# Patient Record
Sex: FEMALE | Race: WHITE | Employment: OTHER | URBAN - NONMETROPOLITAN AREA
[De-identification: names, ages, dates, MRNs, and addresses within clinical notes are randomized per-mention and may not be internally consistent; named-entity substitution may affect disease eponyms.]

---

## 2017-01-17 ENCOUNTER — AMBULATORY - GICH (OUTPATIENT)
Dept: SCHEDULING | Facility: OTHER | Age: 72
End: 2017-01-17

## 2017-01-20 ENCOUNTER — OFFICE VISIT - GICH (OUTPATIENT)
Dept: FAMILY MEDICINE | Facility: OTHER | Age: 72
End: 2017-01-20

## 2017-01-20 ENCOUNTER — HOSPITAL ENCOUNTER (OUTPATIENT)
Dept: RADIOLOGY | Facility: OTHER | Age: 72
End: 2017-01-20
Attending: FAMILY MEDICINE

## 2017-01-20 ENCOUNTER — HISTORY (OUTPATIENT)
Dept: FAMILY MEDICINE | Facility: OTHER | Age: 72
End: 2017-01-20

## 2017-01-20 DIAGNOSIS — R22.1 LOCALIZED SWELLING, MASS OR LUMP OF NECK: ICD-10-CM

## 2017-01-23 ENCOUNTER — HOSPITAL ENCOUNTER (OUTPATIENT)
Dept: RADIOLOGY | Facility: OTHER | Age: 72
End: 2017-01-23
Attending: FAMILY MEDICINE

## 2017-01-23 DIAGNOSIS — R22.1 LOCALIZED SWELLING, MASS OR LUMP OF NECK: ICD-10-CM

## 2017-01-25 ENCOUNTER — AMBULATORY - GICH (OUTPATIENT)
Dept: FAMILY MEDICINE | Facility: OTHER | Age: 72
End: 2017-01-25

## 2017-01-25 DIAGNOSIS — R22.1 LOCALIZED SWELLING, MASS OR LUMP OF NECK: ICD-10-CM

## 2017-01-25 DIAGNOSIS — M54.2 CERVICALGIA: ICD-10-CM

## 2017-01-25 DIAGNOSIS — Z01.812 ENCOUNTER FOR PREPROCEDURAL LABORATORY EXAMINATION: ICD-10-CM

## 2017-01-25 DIAGNOSIS — E11.9 TYPE 2 DIABETES MELLITUS WITHOUT COMPLICATIONS (H): ICD-10-CM

## 2017-01-26 ENCOUNTER — HOSPITAL ENCOUNTER (OUTPATIENT)
Dept: RADIOLOGY | Facility: OTHER | Age: 72
End: 2017-01-26
Attending: FAMILY MEDICINE

## 2017-01-26 ENCOUNTER — AMBULATORY - GICH (OUTPATIENT)
Dept: LAB | Facility: OTHER | Age: 72
End: 2017-01-26

## 2017-01-26 ENCOUNTER — COMMUNICATION - GICH (OUTPATIENT)
Dept: FAMILY MEDICINE | Facility: OTHER | Age: 72
End: 2017-01-26

## 2017-01-26 DIAGNOSIS — Z01.812 ENCOUNTER FOR PREPROCEDURAL LABORATORY EXAMINATION: ICD-10-CM

## 2017-01-26 DIAGNOSIS — E11.9 TYPE 2 DIABETES MELLITUS WITHOUT COMPLICATIONS (H): ICD-10-CM

## 2017-01-26 DIAGNOSIS — R22.1 LOCALIZED SWELLING, MASS OR LUMP OF NECK: ICD-10-CM

## 2017-01-26 DIAGNOSIS — M54.2 CERVICALGIA: ICD-10-CM

## 2017-01-26 LAB
CREAT SERPL-MCNC: 0.92 MG/DL (ref 0.7–1.3)
ESTIMATED AVERAGE GLUCOSE: 177 MG/DL
GFR IF NOT AFRICAN AMERICAN - HISTORICAL: 60 ML/MIN/1.73M2
HEMOGLOBIN A1C MONITORING (POCT) - HISTORICAL: 7.8 % (ref 4–6.2)

## 2017-01-27 ENCOUNTER — COMMUNICATION - GICH (OUTPATIENT)
Dept: FAMILY MEDICINE | Facility: OTHER | Age: 72
End: 2017-01-27

## 2017-01-27 DIAGNOSIS — E78.5 HYPERLIPIDEMIA: ICD-10-CM

## 2017-01-27 DIAGNOSIS — M54.2 CERVICALGIA: ICD-10-CM

## 2017-01-27 DIAGNOSIS — F39 MOOD DISORDER (H): ICD-10-CM

## 2017-02-07 ENCOUNTER — AMBULATORY - GICH (OUTPATIENT)
Dept: FAMILY MEDICINE | Facility: OTHER | Age: 72
End: 2017-02-07

## 2017-02-09 ENCOUNTER — AMBULATORY - GICH (OUTPATIENT)
Dept: SCHEDULING | Facility: OTHER | Age: 72
End: 2017-02-09

## 2017-02-14 ENCOUNTER — AMBULATORY - GICH (OUTPATIENT)
Dept: RADIOLOGY | Facility: OTHER | Age: 72
End: 2017-02-14

## 2017-02-14 DIAGNOSIS — G43.709 CHRONIC MIGRAINE WITHOUT AURA WITHOUT STATUS MIGRAINOSUS, NOT INTRACTABLE: ICD-10-CM

## 2017-02-15 ENCOUNTER — HOSPITAL ENCOUNTER (OUTPATIENT)
Dept: RADIOLOGY | Facility: OTHER | Age: 72
End: 2017-02-15
Attending: RADIOLOGY

## 2017-02-15 DIAGNOSIS — G43.709 CHRONIC MIGRAINE WITHOUT AURA WITHOUT STATUS MIGRAINOSUS, NOT INTRACTABLE: ICD-10-CM

## 2017-02-17 ENCOUNTER — OFFICE VISIT - GICH (OUTPATIENT)
Dept: FAMILY MEDICINE | Facility: OTHER | Age: 72
End: 2017-02-17

## 2017-02-17 ENCOUNTER — HISTORY (OUTPATIENT)
Dept: FAMILY MEDICINE | Facility: OTHER | Age: 72
End: 2017-02-17

## 2017-02-17 DIAGNOSIS — E11.319 TYPE 2 DIABETES MELLITUS WITH RETINOPATHY WITHOUT MACULAR EDEMA (H): ICD-10-CM

## 2017-02-17 DIAGNOSIS — M54.2 CERVICALGIA: ICD-10-CM

## 2017-03-09 ENCOUNTER — AMBULATORY - GICH (OUTPATIENT)
Dept: SCHEDULING | Facility: OTHER | Age: 72
End: 2017-03-09

## 2017-03-15 ENCOUNTER — COMMUNICATION - GICH (OUTPATIENT)
Dept: RADIOLOGY | Facility: OTHER | Age: 72
End: 2017-03-15

## 2017-03-17 ENCOUNTER — AMBULATORY - GICH (OUTPATIENT)
Dept: RADIOLOGY | Facility: OTHER | Age: 72
End: 2017-03-17

## 2017-03-17 DIAGNOSIS — G43.709 CHRONIC MIGRAINE WITHOUT AURA WITHOUT STATUS MIGRAINOSUS, NOT INTRACTABLE: ICD-10-CM

## 2017-05-10 ENCOUNTER — HOSPITAL ENCOUNTER (OUTPATIENT)
Dept: RADIOLOGY | Facility: OTHER | Age: 72
End: 2017-05-10
Attending: RADIOLOGY

## 2017-05-10 DIAGNOSIS — G43.709 CHRONIC MIGRAINE WITHOUT AURA WITHOUT STATUS MIGRAINOSUS, NOT INTRACTABLE: ICD-10-CM

## 2017-06-07 ENCOUNTER — COMMUNICATION - GICH (OUTPATIENT)
Dept: RADIOLOGY | Facility: OTHER | Age: 72
End: 2017-06-07

## 2017-06-14 ENCOUNTER — AMBULATORY - GICH (OUTPATIENT)
Dept: RADIOLOGY | Facility: OTHER | Age: 72
End: 2017-06-14

## 2017-06-14 DIAGNOSIS — G43.709 CHRONIC MIGRAINE WITHOUT AURA WITHOUT STATUS MIGRAINOSUS, NOT INTRACTABLE: ICD-10-CM

## 2017-08-10 ENCOUNTER — HOSPITAL ENCOUNTER (OUTPATIENT)
Dept: RADIOLOGY | Facility: OTHER | Age: 72
End: 2017-08-10
Attending: RADIOLOGY

## 2017-08-10 DIAGNOSIS — G43.709 CHRONIC MIGRAINE WITHOUT AURA WITHOUT STATUS MIGRAINOSUS, NOT INTRACTABLE: ICD-10-CM

## 2017-08-17 ENCOUNTER — COMMUNICATION - GICH (OUTPATIENT)
Dept: FAMILY MEDICINE | Facility: OTHER | Age: 72
End: 2017-08-17

## 2017-08-17 ENCOUNTER — AMBULATORY - GICH (OUTPATIENT)
Dept: RADIOLOGY | Facility: OTHER | Age: 72
End: 2017-08-17

## 2017-08-17 DIAGNOSIS — I10 ESSENTIAL (PRIMARY) HYPERTENSION: ICD-10-CM

## 2017-08-17 DIAGNOSIS — G43.709 CHRONIC MIGRAINE WITHOUT AURA WITHOUT STATUS MIGRAINOSUS, NOT INTRACTABLE: ICD-10-CM

## 2017-08-25 ENCOUNTER — COMMUNICATION - GICH (OUTPATIENT)
Dept: FAMILY MEDICINE | Facility: OTHER | Age: 72
End: 2017-08-25

## 2017-08-25 DIAGNOSIS — E11.319 TYPE 2 DIABETES MELLITUS WITH RETINOPATHY WITHOUT MACULAR EDEMA (H): ICD-10-CM

## 2017-09-07 ENCOUNTER — COMMUNICATION - GICH (OUTPATIENT)
Dept: RADIOLOGY | Facility: OTHER | Age: 72
End: 2017-09-07

## 2017-09-28 ENCOUNTER — OFFICE VISIT - GICH (OUTPATIENT)
Dept: FAMILY MEDICINE | Facility: OTHER | Age: 72
End: 2017-09-28

## 2017-09-28 ENCOUNTER — HISTORY (OUTPATIENT)
Dept: FAMILY MEDICINE | Facility: OTHER | Age: 72
End: 2017-09-28

## 2017-09-28 DIAGNOSIS — E11.319 TYPE 2 DIABETES MELLITUS WITH RETINOPATHY WITHOUT MACULAR EDEMA (H): ICD-10-CM

## 2017-09-28 DIAGNOSIS — F39 MOOD DISORDER (H): ICD-10-CM

## 2017-09-28 DIAGNOSIS — E78.5 HYPERLIPIDEMIA: ICD-10-CM

## 2017-09-28 DIAGNOSIS — Z23 ENCOUNTER FOR IMMUNIZATION: ICD-10-CM

## 2017-09-28 DIAGNOSIS — I10 ESSENTIAL (PRIMARY) HYPERTENSION: ICD-10-CM

## 2017-09-28 DIAGNOSIS — K21.9 GASTRO-ESOPHAGEAL REFLUX DISEASE WITHOUT ESOPHAGITIS: ICD-10-CM

## 2017-09-28 DIAGNOSIS — M54.2 CERVICALGIA: ICD-10-CM

## 2017-09-28 LAB
ANION GAP - HISTORICAL: 11 (ref 5–18)
BUN SERPL-MCNC: 28 MG/DL (ref 7–25)
BUN/CREAT RATIO - HISTORICAL: 30
CALCIUM SERPL-MCNC: 10.2 MG/DL (ref 8.6–10.3)
CHLORIDE SERPLBLD-SCNC: 103 MMOL/L (ref 98–107)
CHOL/HDL RATIO - HISTORICAL: 5.73
CHOLESTEROL TOTAL: 292 MG/DL
CO2 SERPL-SCNC: 21 MMOL/L (ref 21–31)
CREAT SERPL-MCNC: 0.94 MG/DL (ref 0.7–1.3)
ESTIMATED AVERAGE GLUCOSE: 189 MG/DL
GFR IF NOT AFRICAN AMERICAN - HISTORICAL: 59 ML/MIN/1.73M2
GLUCOSE SERPL-MCNC: 115 MG/DL (ref 70–105)
HDLC SERPL-MCNC: 51 MG/DL (ref 23–92)
HEMOGLOBIN A1C MONITORING (POCT) - HISTORICAL: 8.2 % (ref 4–6.2)
LDL COMMENT - HISTORICAL: ABNORMAL
NON-HDL CHOLESTEROL - HISTORICAL: 241 MG/DL
POTASSIUM SERPL-SCNC: 4.5 MMOL/L (ref 3.5–5.1)
PROVIDER ORDERDED STATUS - HISTORICAL: ABNORMAL
SODIUM SERPL-SCNC: 135 MMOL/L (ref 133–143)
TRIGL SERPL-MCNC: 407 MG/DL

## 2017-09-29 LAB
ALB RAND URINE - HISTORICAL: <5 MG/L
CREATININE, URINE - HISTORICAL: 0.21 G/L
MICROALBUMIN, RAND UR - HISTORICAL: NORMAL MG/G CREAT
PROTEIN QUALITATIVE,URINE - HISTORICAL: NEGATIVE MG/DL

## 2017-10-10 ENCOUNTER — AMBULATORY - GICH (OUTPATIENT)
Dept: FAMILY MEDICINE | Facility: OTHER | Age: 72
End: 2017-10-10

## 2017-10-10 DIAGNOSIS — E11.319 TYPE 2 DIABETES MELLITUS WITH RETINOPATHY WITHOUT MACULAR EDEMA (H): ICD-10-CM

## 2017-10-23 ENCOUNTER — HOSPITAL ENCOUNTER (OUTPATIENT)
Dept: RADIOLOGY | Facility: OTHER | Age: 72
End: 2017-10-23
Attending: RADIOLOGY

## 2017-10-23 DIAGNOSIS — G43.709 CHRONIC MIGRAINE WITHOUT AURA WITHOUT STATUS MIGRAINOSUS, NOT INTRACTABLE: ICD-10-CM

## 2017-12-19 ENCOUNTER — AMBULATORY - GICH (OUTPATIENT)
Dept: RADIOLOGY | Facility: OTHER | Age: 72
End: 2017-12-19

## 2017-12-19 DIAGNOSIS — G43.709 CHRONIC MIGRAINE WITHOUT AURA WITHOUT STATUS MIGRAINOSUS, NOT INTRACTABLE: ICD-10-CM

## 2017-12-27 NOTE — PROGRESS NOTES
Patient Information     Patient Name MRN Sex Natalie Powell [id number] Female 1945      Progress Notes by Kirsten Marrero RN at 10/23/2017  3:00 PM     Author:  Kirsten Marrero RN Service:  (none) Author Type:  NURS- Registered Nurse     Filed:  10/23/2017  3:32 PM Date of Service:  10/23/2017  3:00 PM Status:  Signed     :  Kirsten Marrero RN (NURS- Registered Nurse)            See Botox documentation form for injection sites.    Informed patient of her recent lab results from 17 per her request as she has not been able to get on mychart.    Advised patient to discuss recent lab results with Dr. Martinez    Patient given information to reset her password for My chart.      Discharge Note    Data:  Natalie Saab has been discharged home at 1500 via ambulatory accompanied by Registered Nurse.      Action:  Written discharge/follow-up instructions were provided to Patient. Prescriptions : None.  Belongings sent with patient. Medications from home sent with patient/family: Not Applicable  Equipment none .     Response:  Patient verbalized understanding of discharge instructions, reason for discharge, and necessary follow-up appointments.

## 2017-12-27 NOTE — PROGRESS NOTES
Patient Information     Patient Name MRN Sex Natalie Powell [id number] Female 1945      Progress Notes by Melanie Appiah RN at 8/10/2017  3:20 PM     Author:  Melanie Appiah RN Service:  (none) Author Type:  NURS- Registered Nurse     Filed:  8/10/2017  3:20 PM Date of Service:  8/10/2017  3:20 PM Status:  Signed     :  Melanie Appiah RN (NURS- Registered Nurse)            Discharge Note    Data:  Natalie Saab has been discharged home at 1503 via ambulatory accompanied by Registered Nurse.      Action:  Written discharge/follow-up instructions were provided to patient. Prescriptions : None.  Belongings sent with patient. Medications from home sent with patient/family: Not Applicable  Equipment none .     Response:  Patient verbalized understanding of discharge instructions, reason for discharge, and necessary follow-up appointments.

## 2017-12-27 NOTE — PROGRESS NOTES
Patient Information     Patient Name MRN Sex Natalie Powell [id number] Female 1945      Progress Notes by Melanie Appiah RN at 8/10/2017  2:15 PM     Author:  Melanie Appiah RN Service:  (none) Author Type:  NURS- Registered Nurse     Filed:  8/10/2017  2:44 PM Date of Service:  8/10/2017  2:15 PM Status:  Signed     :  Melanie Appiah RN (NURS- Registered Nurse)            Falls Risk Criteria:    Age 65 and older or under age 4        Sensory deficits    Poor vision    Use of ambulatory aides    Impaired judgment    Unable to walk independently    Meets High Risk criteria for falls:  no

## 2017-12-28 NOTE — TELEPHONE ENCOUNTER
Patient Information     Patient Name MRN Natalie Yin [id number] Female 1945      Telephone Encounter by Tarun Jimenez RN at 2017  3:41 PM     Author:  Tarun Jimenez RN Service:  (none) Author Type:  NURS- Registered Nurse     Filed:  2017  3:54 PM Encounter Date:  2017 Status:  Signed     :  Tarun Jimenez RN (NURS- Registered Nurse)            Writer received soft transfer from scheduling staff with patient on the line. Scheduling staff states that patient is calling as she is completely out of her diabetic medications. Writer accepted call as requested. Patient reports that she is in need of a refill of her metformin. Is aware that she is overdue for an office visit with PCP, and has scheduled one on 17. Writer sees that appointment for medication management has been scheduled at this time. [facility]    Biguanides    Office visit in the past 12 months or per provider note.    Last visit with DARRELL SWENSON was on: 2017 in [facility]  Next visit with DARRELL SWENSON is on: 2017 in [facility]  Next visit with Family Practice is on: 2017 in [facility]    Lab test requirements:  HgbA1c annually or per provider note.  HEMOGLOBIN A1C MONITORING (POCT)    Date Value   2017 7.8 % (H)   2013 5.9 % Total Hgb     Max refill for 12 months from last office visit or per provider note.    Chart review shows that patient is indeed in need of a refill of rx as requested. Writer advised patient that he would fill rx as requested for a limited supply to get her through her appointment date as noted. Patient happy with plan of care.    Prescription refilled per RN Medication Refill Policy.................... Tarun Jimenez RN ....................  2017   3:53 PM

## 2017-12-28 NOTE — PROGRESS NOTES
Patient Information     Patient Name MRN Sex Natalie Powell [id number] Female 1945      Progress Notes by Stephanie Martinez DO at 2017  1:15 PM     Author:  Stephanie Martinez DO Service:  (none) Author Type:  PHYS- Osteopathic     Filed:  10/2/2017 11:53 AM Encounter Date:  2017 Status:  Signed     :  Stephanie Martinez DO (PHYS- Osteopathic)            SUBJECTIVE:  Natalie Saab is a 71 y.o. female who presents for chronic med management/refill.    HPI  Natalie has a history of:  1. HTN, for which she is currently on Lasix, losartan.  She denies any high bps at home.  NO CP, SOB, tinnitus, headaches, vision changes.  She is taking an 81mg asa daily.   2. DM2.  She is on metformin, glyburide and some gabapentin due to early neuropathy symptoms.  She has no concerns today.  Due to go back for eye exam; has early cataract.  Checks blood sugars intermittently - usually all ~120-140s.  3. GERD.  Stable on her omeprazole.  Only bothersome if she eats something she isn't used to, overeats or later at night.  4. She also has history of dysthymia.  Has been feeling more down overall.  Preoccupied that her  was just diagnosed with Alzheimers disorder.  MOre pressure on her at home.  Interested in increasing her dose.    PHQ Depression Screening 10/27/2016 2017   Date of PHQ exam (doc flow) 10/27/2016 2017   1. Lack of interest/pleasure 0 - Not at all 0 - Not at all   2. Feeling down/depressed 0 - Not at all 0 - Not at all   PHQ-2 TOTAL SCORE 0 0   3. Trouble sleeping 3 - Nearly every day -   4. Decreased energy 2 - More than half the days -   5. Appetite change 0 - Not at all -   6. Feelings of failure 0 - Not at all -   7. Trouble concentrating 0 - Not at all -   8. Activity level 0 - Not at all -   9. Hurting yourself 0 - Not at all -   PHQ-9 TOTAL SCORE 5 -   PHQ-9 Severity Level mild -   Functional Impairment not difficult at all -   Some recent data might be hidden          Allergies      Allergen   Reactions     Eletriptan  Throat Swelling/Closing     Sumatriptan  Throat Swelling/Closing     Morphine  Rash     Dermat.-    ,   Current Outpatient Prescriptions on File Prior to Visit       Medication  Sig Dispense Refill     ASPIRIN 81 MG TAB, DELAYED RELEASE take 1 tablet (81mg) by oral route once daily 100 0     ASPIRIN/ACETAMINOPHEN/CAFFEINE (EXCEDRIN MIGRAINE ORAL) Take 2 tablets by mouth 3 times daily if needed (FOR MIGRAINE).       blood sugar diagnostic (BLOOD GLUCOSE TEST) strip Dispense item covered by pt ins. E11.9 NIDDM type II - Test 3 times/day. Reason: Med change 200 Each 11     blood-glucose meter Dispense meter, test strips, lancets covered by pt ins. E11.9 NIDDM type II - Test 3 times/day, Reason: Med change 1 Device 0     ibuprofen (ADVIL; MOTRIN) 800 mg tablet Take 1 tablet by mouth every 8 hours if needed for Pain. 100 tablet 0     lancets Dispense item covered by pt ins. E11.9 NIDDM type II - Test 3 times/day, Reason: Med change 100 Each 11     multivitamin (MVI) tablet Take 1 tablet by mouth once daily.       ranitidine (ZANTAC) 300 mg tablet Take 1 tablet by mouth at bedtime. 30 tablet 5     No current facility-administered medications on file prior to visit.     and   Past Medical History:     Diagnosis  Date     Acute pancreatitis-post stone 10/21/2012     Diabetes mellitus (HC)      Dysthymic disorder      Edema     lower extremities      GERD (gastroesophageal reflux disease)      Hyperlipidemia      Osteoarthritis      Syncope and collapse 5/28/2012    [facility]osp [facility], med changes made        REVIEW OF SYSTEMS:  Review of Systems   All other systems reviewed and are negative.      OBJECTIVE:  /88  Pulse 84  Wt 70 kg (154 lb 6.4 oz)  LMP 01/01/1991  BMI 27.35 kg/m2    EXAM:   Physical Exam   Constitutional: She is well-developed, well-nourished, and in no distress.   HENT:   Head: Normocephalic and atraumatic.   Right Ear: External ear normal.   Left  Ear: External ear normal.   Cardiovascular: Normal rate and normal heart sounds.    Pulmonary/Chest: Effort normal and breath sounds normal.   Abdominal: Soft. Bowel sounds are normal.   Psychiatric: Mood and affect normal.   Nursing note and vitals reviewed.    Results for orders placed or performed in visit on 09/28/17      LIPID PANEL      Result  Value Ref Range    CHOLESTEROL,TOTAL 292 (H) <200 mg/dL    TRIGLYCERIDES 407 (H) <150 mg/dL    HDL CHOLESTEROL 51 23 - 92 mg/dL    NON-HDL CHOLESTEROL 241 (H) <145 mg/dl    CHOL/HDL RATIO            5.73 (H) <4.50                    LDL CHOLESTEROL      PROVIDER ORDERED STATUS RANDOM    Hgb A1c      Result  Value Ref Range    HEMOGLOBIN A1C MONITORING (POCT) 8.2 (H) 4.0 - 6.2 %    ESTIMATED AVERAGE GLUCOSE  189 mg/dL   BASIC METABOLIC PANEL      Result  Value Ref Range    SODIUM 135 133 - 143 mmol/L    POTASSIUM 4.5 3.5 - 5.1 mmol/L    CHLORIDE 103 98 - 107 mmol/L    CO2,TOTAL 21 21 - 31 mmol/L    ANION GAP 11 5 - 18                    GLUCOSE 115 (H) 70 - 105 mg/dL    CALCIUM 10.2 8.6 - 10.3 mg/dL    BUN 28 (H) 7 - 25 mg/dL    CREATININE 0.94 0.70 - 1.30 mg/dL    BUN/CREAT RATIO           30                    GFR if African American >60 >60 ml/min/1.73m2    GFR if not African American 59 (L) >60 ml/min/1.73m2   MICROALBUMIN RANDOM URINE      Result  Value Ref Range    ALB RAND URINE            <5.0 mg/L    CREATININE,URINE          0.21 g/L    MICROALBUMIN,RAND UR       <30.0 mg/g creat    PROTEIN, URINE Negative Negative mg/dL       ASSESSMENT/PLAN:    ICD-10-CM    1. HTN (hypertension)-Labile with high risk Up and Down  I10 furosemide (LASIX) 20 mg tablet      losartan (COZAAR) 50 mg tablet      BASIC METABOLIC PANEL      MICROALBUMIN RANDOM URINE      BASIC METABOLIC PANEL      MICROALBUMIN RANDOM URINE   2. Hyperlipidemia, unspecified hyperlipidemia type E78.5 atorvastatin (LIPITOR) 40 mg tablet      LIPID PANEL      LIPID PANEL   3. Affective disorder (HC) F39  citalopram (CELEXA) 40 mg tablet   4. NECK PAIN M54.2 gabapentin (NEURONTIN) 300 mg capsule      tiZANidine (ZANAFLEX) 4 mg tablet   5. Type 2 diabetes mellitus with retinopathy without macular edema, without long-term current use of insulin, unspecified laterality, unspecified retinopathy severity E11.319 metFORMIN (GLUCOPHAGE) 1,000 mg tablet      Hgb A1c      MICROALBUMIN RANDOM URINE      Hgb A1c      MICROALBUMIN RANDOM URINE   6. Gastroesophageal reflux disease without esophagitis K21.9 omeprazole (PRILOSEC) 40 mg Delayed-Release capsule   7. Needs flu shot Z23 FLU VACCINE => 65 YRS HIGH DOSE TRIVALENT IIV3 IM      [place] ADMIN VACC INITIAL        Plan:   1. HTN, chronic, stable:  2. HLP, chronic, stable: due for lipid panel today.  For now, continue with lipitor.  3. Dysthymia/affective disorder.  Increase to 40mg daily.  Discussed returning to 20mg if worsening side effects or not effective after 4 weeks.  4.  Neck pain, chronic, stable: helped by her gabapentin, but also refilled her muscle relaxer prn.   5. DM2, chronic, stable: monitoring lab as above, including a Hgb A1c.  Hopeful to remain on oral agents.  Continue with ASA.  Recommend following up for eye exam.  6. GERD, chronic, stable; refilled omeprazole at current dose.  Discussed possibility of changing to ranitidine in the future.  Information given to Natalie today.  7.  Flu shot updated for 6932-3302 season.  High dose given due to age.    Follow up every 6 months; sooner prn.

## 2017-12-28 NOTE — TELEPHONE ENCOUNTER
Patient Information     Patient Name MRN Sex Natalie Powell [id number] Female 1945      Telephone Encounter by Pilar Paniagua RN at 2017  8:12 AM     Author:  Pilar Paniagua RN Service:  (none) Author Type:  NURS- Registered Nurse     Filed:  2017  8:23 AM Encounter Date:  2017 Status:  Signed     :  Pilar Paniagua RN (NURS- Registered Nurse)            Diuretics (may be prescribed for edema)  Office visit in the past 12 months or per provider note.  Last visit with DARRELL SWENSON was on: 2017 in [facility]  Next visit with DARRELL SWENSON is on: No future appointment listed with this provider  Lab testing requirements:  Creatinine and Potassium annually, if ordering lab, order BMP.  CREATININE (mg/dL)    Date Value   2017 0.92     POTASSIUM (mmol/L)    Date Value   2016 4.2     BP Readings from Last 4 Encounters:    08/10/17 158/91   05/10/17 135/71   17 102/66   17 142/74   Review last provider visit note.  If BP reviewed and plan is noted, can refill.  Max refill for 12 months from last office visit or per provider note.  Due for exam.  Limited refill per protocol and letter mailed.  Pilar Paniagua RN ........   2017    8:15 AM        Angiotensin Receptor Blockers (ARB)  Office visit in the past 12 months or per provider note.  Last visit with DARRELL SWENSON was on: 2017 in [facility]  Next visit with DARRELL SWENSON is on: No future appointment listed with this provider  Next visit with Family Practice is on: No future appointment listed in this department  Lab test requirements:  Creatinine and Potassium annually, if ordering lab, order BMP.  CREATININE (mg/dL)    Date Value   2017 0.92     POTASSIUM (mmol/L)    Date Value   2016 4.2   Max refill for 12 months from last office visit or per provider note  Due for exam.  Limited refill per protocol and letter mailed.  Pilar Paniagua RN ........    8/21/2017    8:16 AM

## 2017-12-28 NOTE — TELEPHONE ENCOUNTER
Patient Information     Patient Name MRN Natalie Yin [id number] Female 1945      Telephone Encounter by Kirsten Marrero RN at 2017 11:23 AM     Author:  Kirsten Marrero RN Service:  (none) Author Type:  NURS- Registered Nurse     Filed:  2017 11:28 AM Encounter Date:  2017 Status:  Signed     :  Kirsten Marrero RN (NURS- Registered Nurse)              Has the number of migraine days decreased since your Botox injection?  Yes, has had 1 headache in the last week    Has the number of hours per day decreased since your Botox injection?  Yes    Has the severity of the migraine decreased since your Botox injection?  Yes      Pain Scale:  3/10    Headache Location and Description:  throbbing pain, sharp pain, located around eyes and back of head    Any trouble swallowing:  None    Headache Associated Symptoms:  Has not had vomiting recently with migraines    Headache Pain Context:  nothing    Do you want to schedule a 12 week Botox migraine treatment?  Yes, definitely

## 2017-12-28 NOTE — TELEPHONE ENCOUNTER
Patient Information     Patient Name MRN Natalie Yin [id number] Female 1945      Telephone Encounter by Abigail Carlos RN at 2017  1:34 PM     Author:  Abigail Carlos RN Service:  (none) Author Type:  NURS- Registered Nurse     Filed:  2017  1:38 PM Encounter Date:  3/15/2017 Status:  Signed     :  Abigail Carlos RN (NURS- Registered Nurse)              Has the number of migraine days decreased since your Botox injection?  Yes    Has the number of hours per day decreased since your Botox injection?  Yes    Has the severity of the migraine decreased since your Botox injection?  Yes      Pain Scale:  3/10    Headache Location and Description:  dull pain , all over her head    Headache Associated Symptoms:  nausea and blurred vision. Light sensitive    Headache Pain Context:  nothing    Do you want to schedule a 12 week Botox migraine treatment?  Yes, states it should be scheduled in 10 weeks.

## 2017-12-28 NOTE — TELEPHONE ENCOUNTER
Patient Information     Patient Name MRN Natalie Yin [id number] Female 1945      Telephone Encounter by Kirsten Marrero RN at 2017  9:41 AM     Author:  Kirsten Marrero RN Service:  (none) Author Type:  NURS- Registered Nurse     Filed:  2017  9:46 AM Encounter Date:  2017 Status:  Signed     :  Kirsten Marrero RN (NURS- Registered Nurse)              Has the number of migraine days decreased since your Botox injection?  Yes    Has the number of hours per day decreased since your Botox injection?  Yes    Has the severity of the migraine decreased since your Botox injection?  Yes      Pain Scale:  4/10    Headache Location and Description:  sharp pain, bilateral in the frontal area, bilateral in the occipital area    Headache Associated Symptoms:  Vomiting, has become less frequent with treatment    Headache Pain Context:  nothing    Do you want to schedule a 12 week Botox migraine treatment?  Yes

## 2017-12-30 NOTE — NURSING NOTE
Patient Information     Patient Name MRN Sex Natalie Powell [id number] Female 1945      Nursing Note by Clemencia Rodrigues at 2017  1:15 PM     Author:  Clemencia Rodrigues Service:  (none) Author Type:  (none)     Filed:  2017  1:30 PM Encounter Date:  2017 Status:  Signed     :  Clemencia Rodrigues            Previous A1C is at goal of <8  HEMOGLOBIN A1C MONITORING (POCT)    Date Value   2017 7.8 % (H)   2013 5.9 % Total Hgb     Urine microalbumin:creatine: 47.2  Foot exam unknown  Eye exam patient states she needs to get this done    Patient is not a current smoker  Patient is on a daily aspirin  Patient is on a Statin.  Blood pressure today of   is at the goal of <139/89 for diabetics.    Clemencia Rodrigues LPN..............2017 1:20 PM

## 2018-01-03 ENCOUNTER — HOSPITAL ENCOUNTER (OUTPATIENT)
Dept: RADIOLOGY | Facility: OTHER | Age: 73
End: 2018-01-03
Attending: RADIOLOGY

## 2018-01-03 DIAGNOSIS — G43.709 CHRONIC MIGRAINE WITHOUT AURA WITHOUT STATUS MIGRAINOSUS, NOT INTRACTABLE: ICD-10-CM

## 2018-01-03 NOTE — OR POSTOP
Patient Information     Patient Name MRN Sex Natalie Powell [id number] Female 1945      OR PostOp by Abigail Carlos RN at 2/15/2017  1:50 PM     Author:  Abigail Carlos RN Service:  (none) Author Type:  NURS- Registered Nurse     Filed:  2/15/2017  1:59 PM Date of Service:  2/15/2017  1:50 PM Status:  Signed     :  Abigail Carlos RN (NURS- Registered Nurse)            Discharge Note    Data:  Natalie Saab has been discharged home at 1350 via ambulatory accompanied by Registered Nurse.      Action:  Written discharge/follow-up instructions were provided to patient. Prescriptions : None.  Belongings sent with patient. Medications from home sent with patient/family: Not Applicable  Equipment none .     Response:  Patient verbalized understanding of discharge instructions, reason for discharge, and necessary follow-up appointments.

## 2018-01-03 NOTE — PROGRESS NOTES
Patient Information     Patient Name MRN Sex Natalie Powell [id number] Female 1945      Progress Notes by Abigail Carlos RN at 2/15/2017  1:31 PM     Author:  Abigail Carlos RN Service:  (none) Author Type:  NURS- Registered Nurse     Filed:  2/15/2017  1:57 PM Date of Service:  2/15/2017  1:31 PM Status:  Signed     :  Abigail Carlos RN (NURS- Registered Nurse)            Universal Protocol    A. Pre-procedure verification complete yes  1-relevant information / documentation available, reviewed and properly matched to the patient; 2-consent accurate and complete, 3-equipment and supplies available    B. Site marking complete N/A  Site marked if not in continuous attendance with patient    C. TIME OUT completed yes  Time Out was conducted just prior to starting procedure to verify the eight required elements: 1-patient identity, 2-consent accurate and complete, 3-position, 4-correct side/site marked (if applicable), 5-procedure, 6-relevant images / results properly labeled and displayed (if applicable), 7-antibiotics / irrigation fluids (if applicable), 8-safety precautions.

## 2018-01-03 NOTE — PROGRESS NOTES
Patient Information     Patient Name MRN Sex Natalie Powell [id number] Female 1945      Progress Notes by Valentine Heredia at 2017  1:45 PM     Author:  Valentine Heredia Service:  (none) Author Type:  Other Clinical Staff     Filed:  2017  1:45 PM Date of Service:  2017  1:45 PM Status:  Signed     :  Valentine Heredia (Other Clinical Staff)            IV Contrast- Discharge Instructions After Your CT Scan      The IV contrast you received today will be filtered from your bloodstream by your kidneys during the next 24 hours and pass from the body in urine.  You will not be aware of this process and your urine will not change in color.  To help this process you should drink at least 4 additional glasses of water or juice today.  This reduces stress on your kidneys.    Most contrast reactions are immediate.  Should you develop symptoms of concern after discharge, contact the department at the number below.  After hours you should contact your personal physician.  If you develop breathing distress or wheezing, call 911.

## 2018-01-03 NOTE — PROGRESS NOTES
"Patient Information     Patient Name MRN Sex Natalie Powell [id number] Female 1945      Progress Notes by Stephanie Martinez DO at 2017  1:15 PM     Author:  Stephanie Martinez DO Service:  (none) Author Type:  PHYS- Osteopathic     Filed:  2017  1:55 PM Encounter Date:  2017 Status:  Signed     :  Stephanie Martinez DO (PHYS- Osteopathic)            SUBJECTIVE:  Natalie Saab is a 71 y.o. female who presents for neck concern    HPI  Natalie is here for concerns of a neck \"lump\" felt by PT after a few sessions.  Causes significant tenderness.  They are concerned of further work in the area until it is evaluated.  Seems like this spot causes tenderness and a difficulty getting comfortable to fall asleep at night.  She has personal history of lipomas (upper R inner arm).    Allergies      Allergen   Reactions     Eletriptan  Throat Swelling/Closing     Sumatriptan  Throat Swelling/Closing     Morphine  Rash     Dermat.-    ,   Current Outpatient Prescriptions on File Prior to Visit       Medication  Sig Dispense Refill     ASPIRIN 81 MG TAB, DELAYED RELEASE take 1 tablet (81mg) by oral route once daily 100 0     ASPIRIN/ACETAMINOPHEN/CAFFEINE (EXCEDRIN MIGRAINE ORAL) Take 2 tablets by mouth 3 times daily if needed (FOR MIGRAINE).       atorvastatin (LIPITOR) 40 mg tablet Take 1 tablet by mouth once daily. 90 tablet 3     blood sugar diagnostic (BLOOD GLUCOSE TEST) strip Dispense item covered by pt ins. E11.9 NIDDM type II - Test 2 times/day. Reason: Med change 200 Each 4     citalopram (CELEXA) 20 mg tablet Take 1 tablet by mouth once daily. 90 tablet 3     furosemide (LASIX) 20 mg tablet Take 2 tablets by mouth every morning. 180 tablet 3     gabapentin (NEURONTIN) 300 mg capsule 1 capsule in the AM and 2 Capsules in the  capsule 3     ibuprofen (ADVIL; MOTRIN) 800 mg tablet Take 1 tablet by mouth every 8 hours if needed for Pain. 100 tablet 0     losartan (COZAAR) 50 mg tablet Take " 1 tablet by mouth once daily. 90 tablet 3     metFORMIN (GLUCOPHAGE) 1,000 mg tablet TAKE 1 TABLET TWICE DAILY WITH MEALS 180 tablet 1     multivitamin (MVI) tablet Take 1 tablet by mouth once daily.       omeprazole (PRILOSEC) 40 mg Delayed-Release capsule Take 1 capsule by mouth once daily before a meal. DO NOT CRUSH OR CHEW 90 capsule 3     ranitidine (ZANTAC) 300 mg tablet Take 1 tablet by mouth at bedtime. 30 tablet 5     tiZANidine (ZANAFLEX) 4 mg tablet Take 1 tablet by mouth every 8 hours if needed for Muscle Spasm. 60 tablet 5     No current facility-administered medications on file prior to visit.     and   Past Medical History      Diagnosis   Date     Acute pancreatitis-post stone  10/21/2012     Diabetes mellitus (HC)       Dysthymic disorder       Edema       lower extremities      GERD (gastroesophageal reflux disease)       Hyperlipidemia       Osteoarthritis       Syncope and collapse  5/28/2012     [facility], med changes made        REVIEW OF SYSTEMS:  Review of Systems   Constitutional: Negative for chills and fever.   Cardiovascular: Negative for chest pain and palpitations.   Skin: Negative for rash.       OBJECTIVE:  /74  Pulse 84  Wt 71.8 kg (158 lb 6.4 oz)  LMP 01/01/1991  BMI 28.06 kg/m2    EXAM:   Physical Exam   Constitutional: She is well-developed, well-nourished, and in no distress.   HENT:   Head: Normocephalic and atraumatic.   Eyes: EOM are normal. Pupils are equal, round, and reactive to light.   Neck: Normal range of motion. Neck supple. No tracheal deviation present. No thyromegaly present.   Small ~1-2cm poorly differentiated nodule along deep cervical chain.  Rubbery type texture; consistent with possible lipoma.   Lymphadenopathy:     She has no cervical adenopathy.   Nursing note and vitals reviewed.    CXR: degenerative changes seen; no nodule or bony growth.  Radiology read: pending  I personally reviewed image results with Natalie in the office  today.    ASSESSMENT/PLAN:    ICD-10-CM    1. Nodule of neck R22.1 XR SPINE CERVICAL 3 VIEWS      US NECK OR HEAD SOFT TISSUE        Plan:    Nodule of neck:  Possible lymph node, muscle tightness, lipoma.  Bony growth ruled out with normal XR today.  Will obtain US of the area for further classification.  Consider removal due to chronic neck pain, and amount of tenderness associated with lesion.  Follow up pending above result.

## 2018-01-03 NOTE — PROGRESS NOTES
Patient Information     Patient Name MRN Sex Natalie Powell [id number] Female 1945      Progress Notes by Sophie Pryor at 2017  2:03 PM     Author:  Sophie Pryor Service:  (none) Author Type:  Other Clinical Staff     Filed:  2017  2:03 PM Date of Service:  2017  2:03 PM Status:  Signed     :  Sophie Pryor (Other Clinical Staff)            1.  Is patient currently taking metformin? Yes     If NO: Technologist will give the patient normal post CT instructions.     If YES: Technologist will obtain GFR from Lab.    2.  Is GFR is greater than 60? Yes     If YES: Technologist will give the patient normal post CT instructions.     If NO: Technologist will give the patient METFORMIN post CT instructions.

## 2018-01-03 NOTE — PROGRESS NOTES
Patient Information     Patient Name MRN Sex Natalie Powell [id number] Female 1945      Progress Notes by Valentine Heredia at 2017  1:44 PM     Author:  Valentine Heredia Service:  (none) Author Type:  Other Clinical Staff     Filed:  2017  1:45 PM Date of Service:  2017  1:44 PM Status:  Signed     :  Valentine Heredia (Other Clinical Staff)            Falls Risk Criteria:    Age 65 and older or under age 4        Sensory deficits    Poor vision    Use of ambulatory aides    Impaired judgment    Unable to walk independently    Meets High Risk criteria for falls:  Yes             1.  Do you have dizziness or vertigo?    no                    2.  Do you need help standing or walking?   no                 3.  Have you fallen within the last 6 months?    no           4.  Has the patient been fasting?      no       If any risks are marked Yes, the following interventions are utilized:    Do not leave patient unattended     Assist patient in the dressing room and bathroom    Have ambulatory aides available throughout procedure    Involve patient s family if available

## 2018-01-03 NOTE — TELEPHONE ENCOUNTER
Patient Information     Patient Name MRN Natalie Yin [id number] Female 1945      Telephone Encounter by Melinda Haji RN at 3/15/2017  9:49 AM     Author:  Melinda Haji RN Service:  (none) Author Type:  NURS- Registered Nurse     Filed:  3/15/2017  9:56 AM Encounter Date:  3/15/2017 Status:  Signed     :  Melinda Haji RN (NURS- Registered Nurse)            .  Has the number of migraine days decreased since your Botox injection?  Yes    Has the number of hours per day decreased since your Botox injection?  Yes    Has the severity of the migraine decreased since your Botox injection?  Yes      Pain Scale:  4-5/10    Headache Location and Description:  unilateral but varies as to which side    Headache Associated Symptoms:  nausea, diziness, blurred vision, photophobia and stiff neck    Headache Pain Context:  nothing    Do you want to schedule a 12 week Botox migraine treatment?  Yes

## 2018-01-03 NOTE — TELEPHONE ENCOUNTER
Patient Information     Patient Name MRNatalie Velez [id number] Female 1945      Telephone Encounter by Brittany Connolly at 2017 12:46 PM     Author:  Brittany Connolly Service:  (none) Author Type:  (none)     Filed:  2017 12:49 PM Encounter Date:  2017 Status:  Signed     :  Brittany Connolly            Patient is requesting a refill on a few medications. Please call patient to go over the medications she is needing refilled.

## 2018-01-03 NOTE — PROGRESS NOTES
Patient Information     Patient Name MRN Sex Natalie Powell [id number] Female 1945      Progress Notes by Stephanie Martinez DO at 2017  3:45 PM     Author:  Stephanie Martinez DO Service:  (none) Author Type:  PHYS- Osteopathic     Filed:  2017 10:53 AM Encounter Date:  2017 Status:  Signed     :  Stephanie Martinez DO (PHYS- Osteopathic)            SUBJECTIVE:  Natalie Saab is a 71 y.o. female who presents for follow up to Hgb A1c testing.    HPI  Natalie is here with her daughter at my request due to increasing Hgb A1c over the last 1-1.5 years.  She has gone from 7.0 -> 7.8.  Not watching what she is eating.  She requests a new glucometer today as she has not had a new one in years; but also tells me she is out of strips and lancets.  Therefore, I do not know truly how long she has gone without home testing.  Currently only on Metformin 1000mg BID.  She is already on an ARB, statin and ASA.  She does not smoke, but is due for an eye exam soon.    Also states she is due for a refill of her Tizanidine for her neck spasms.  Continues to work with PT for it as well.     Completed botox injections for migraines 2 days ago; she feels good today, but too early to tell how it is affecting her.    Allergies      Allergen   Reactions     Eletriptan  Throat Swelling/Closing     Sumatriptan  Throat Swelling/Closing     Morphine  Rash     Dermat.-    ,   Current Outpatient Prescriptions on File Prior to Visit       Medication  Sig Dispense Refill     ASPIRIN 81 MG TAB, DELAYED RELEASE take 1 tablet (81mg) by oral route once daily 100 0     ASPIRIN/ACETAMINOPHEN/CAFFEINE (EXCEDRIN MIGRAINE ORAL) Take 2 tablets by mouth 3 times daily if needed (FOR MIGRAINE).       atorvastatin (LIPITOR) 40 mg tablet Take 1 tablet by mouth once daily. 90 tablet 1     citalopram (CELEXA) 20 mg tablet Take 1 tablet by mouth once daily. 90 tablet 1     furosemide (LASIX) 20 mg tablet Take 2 tablets by mouth every  morning. 180 tablet 3     gabapentin (NEURONTIN) 300 mg capsule 1 capsule in the AM and 2 Capsules in the  capsule 1     ibuprofen (ADVIL; MOTRIN) 800 mg tablet Take 1 tablet by mouth every 8 hours if needed for Pain. 100 tablet 0     losartan (COZAAR) 50 mg tablet Take 1 tablet by mouth once daily. 90 tablet 3     metFORMIN (GLUCOPHAGE) 1,000 mg tablet TAKE 1 TABLET TWICE DAILY WITH MEALS 180 tablet 1     multivitamin (MVI) tablet Take 1 tablet by mouth once daily.       omeprazole (PRILOSEC) 40 mg Delayed-Release capsule Take 1 capsule by mouth once daily before a meal. DO NOT CRUSH OR CHEW 90 capsule 3     ranitidine (ZANTAC) 300 mg tablet Take 1 tablet by mouth at bedtime. 30 tablet 5     No current facility-administered medications on file prior to visit.     and   Past Medical History      Diagnosis   Date     Acute pancreatitis-post stone  10/21/2012     Diabetes mellitus (HC)       Dysthymic disorder       Edema       lower extremities      GERD (gastroesophageal reflux disease)       Hyperlipidemia       Osteoarthritis       Syncope and collapse  5/28/2012     [facility], med changes made        REVIEW OF SYSTEMS:  Review of Systems   Constitutional: Negative for chills and fever.   Cardiovascular: Negative for chest pain and palpitations.        Continues to have symptoms of pre-syncope (chronic for decades).   Genitourinary: Negative for dysuria.   Skin: Negative for rash.       OBJECTIVE:  /66  Pulse 96  Wt 69.6 kg (153 lb 6.4 oz)  LMP 01/01/1991  BMI 27.17 kg/m2    EXAM:   Physical Exam   Constitutional: She is well-developed, well-nourished, and in no distress.   HENT:   Head: Normocephalic and atraumatic.   Cardiovascular: Normal rate and normal heart sounds.    Pulmonary/Chest: Effort normal and breath sounds normal.   Psychiatric: Mood and affect normal.   Nursing note and vitals reviewed.      ASSESSMENT/PLAN:    ICD-10-CM    1. 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 NECK PAIN M54.2 tiZANidine (ZANAFLEX) 4 mg tablet        Plan:  1. DM2, chronic, worsening:  Will start glyburide 2.5mg daily to help improve glycemic control.  Warned about risks of hypoglycemia with patient and starting with very small dose.  Discussed other medications briefly, but concerned of cost; therefore sulfonylurea was chosen.  She was also Rx a new glucometer, and Rx for strips/lancets was also provided.  Encouraged testing ~3 times per day due to starting new medication.  Will follow up in one month if needed.  Otherwise next Hgb A1c in 3-4 months.  2. NEck pain, chronic, stable: refilled Tizanidine.  Also encouraged ongoing exercises and treatments with PT.

## 2018-01-03 NOTE — TELEPHONE ENCOUNTER
Patient Information     Patient Name MRN Sex Natalie Powell [id number] Female 1945      Telephone Encounter by Pilar Paniagua RN at 2017  7:49 AM     Author:  Pilar Paniagua RN Service:  (none) Author Type:  NURS- Registered Nurse     Filed:  2017  8:08 AM Encounter Date:  2017 Status:  Signed     :  Pilar Paniagua RN (NURS- Registered Nurse)            In clinical absence of patient's primary, DARRELL SWENSON DO, patient is requesting that this message be sent to the primary provider's Teamlet for consideration please.      This is a Refill request from: [facility]  Name of Medication: TiZANidine (ZANAFLEX) 4 mg tablet  Quantity requested: 60   Last fill date: 16  Due for refill: yes  Last visit with DARRELL SWENSON was on: 2017 in [facility]  PCP:  DARRELL SWENSON DO  Controlled Substance Agreement: n/a  Diagnosis r/t this medication request: Neck pain   Unable to complete prescription refill per RN Medication Refill Policy.................... Pilar Paniagua RN ....................  2017   7:52 AM          Depression-in adults 18 and over  SS[facility]  Office visit in the past 12 months or as indicated in chart.  Should have clinic visit 1-2 months after initial prescription.  Last visit with DARRELL SWENSON was on: 2017 in [facility]  Next visit with DARRELL SWENSON is on: No future appointment listed with this provider  Next visit with Family Practice is on: No future appointment listed in this department  Max refills 12 months from last office visit or per providers notes.    PHQ Depression Screening 10/17/2016 10/27/2016   Date of PHQ exam (doc flow) (No Data) 10/27/2016   1. Lack of interest/pleasure - 0 - Not at all   2. Feeling down/depressed - 0 - Not at all   PHQ-2 TOTAL SCORE - 0   3. Trouble sleeping - 3 - Nearly every day   4. Decreased energy - 2 - More than half the days   5. Appetite change - 0 - Not at all   6. Feelings of  failure - 0 - Not at all   7. Trouble concentrating - 0 - Not at all   8. Activity level - 0 - Not at all   9. Hurting yourself - 0 - Not at all   PHQ-9 TOTAL SCORE - 5   PHQ-9 Severity Level - mild   Functional Impairment - not difficult at all   Prescription refilled per RN Medication Refill Policy.................... Pilar Paniagua RN ....................  2017   8:07 AM      Nsaids  Office visit in the past 12 months or per provider note.  Last visit with DARRELL SWENSON was on: 2017 in [facility]  Next visit with DARRELL SWENSON is on: No future appointment listed with this provider  Next visit with [facility] is on: No future appointment listed in this department  Max refill for 12 months from last office visit or per provider note.  Prescription refilled per RN Medication Refill Policy.................... Pilar Paniagua RN ....................  2017   7:52 AM  Prescription refilled per RN Medication Refill Policy.................... Pilar Paniagua RN ....................  2017   8:07 AM        Statins  Office visit in the past 12 months.  Last visit with DARRELL SWENSON was on: 2017 in [facility]  Next visit with DARRELL SWENSON is on: No future appointment listed with this provider  Next visit with Family Practice is on: No future appointment listed in this department  Last Lipids:  Chol: 113    2016  T    2016  HDL:   41    2016  LDL:  43    2016  LDL DIRECT:  59    2013  Max refills 12 months from last office visit.    Prescription refilled per RN Medication Refill Policy.................... Pilar Paniagua RN ....................  2017   7:56 AM

## 2018-01-03 NOTE — NURSING NOTE
Patient Information     Patient Name MRN Sex Natalie Powell [id number] Female 1945      Nursing Note by Stephanie Martinez DO at 2017 10:04 AM     Author:  Stephanie Martinez DO Service:  (none) Author Type:  PHYS- Osteopathic     Filed:  2017 10:06 AM Encounter Date:  2017 Status:  Signed     :  Stephanie Martinez DO (PHYS- Osteopathic)            Patient was notified of normal neck ultrasound; will proceed with CT of neck.    Schedulers: please call to set up CT with patient.  Thank you,    STEPHANIE MARTINEZ DO

## 2018-01-03 NOTE — TELEPHONE ENCOUNTER
"Patient Information     Patient Name MRN Natalie Yin [id number] Female 1945      Telephone Encounter by Pilar Paniagua RN at 2017 12:51 PM     Author:  Pilar Paniagua RN Service:  (none) Author Type:  NURS- Registered Nurse     Filed:  2017 12:56 PM Encounter Date:  2017 Status:  Signed     :  Pilar Paniagua RN (NURS- Registered Nurse)            Patient calls and is requesting, \" refills of all my medications\". Directed patient to contact her pharmacy who in turn will contact [facility] for needed refills per [facility] Refill Protocol. Patient verbalized understanding and intent to comply. Pilar Paniagua RN ....................  2017   12:56 PM          "

## 2018-01-28 VITALS
DIASTOLIC BLOOD PRESSURE: 74 MMHG | HEART RATE: 84 BPM | SYSTOLIC BLOOD PRESSURE: 142 MMHG | SYSTOLIC BLOOD PRESSURE: 102 MMHG | BODY MASS INDEX: 28.06 KG/M2 | HEART RATE: 96 BPM | WEIGHT: 158.4 LBS | WEIGHT: 153.4 LBS | BODY MASS INDEX: 27.17 KG/M2 | DIASTOLIC BLOOD PRESSURE: 66 MMHG

## 2018-01-28 VITALS
WEIGHT: 154.4 LBS | HEART RATE: 84 BPM | SYSTOLIC BLOOD PRESSURE: 124 MMHG | BODY MASS INDEX: 28.24 KG/M2 | DIASTOLIC BLOOD PRESSURE: 88 MMHG

## 2018-02-12 NOTE — MISCELLANEOUS
Patient Information     Patient Name MRN Sex Natalie Powell [id number] Female 1945      Protocol by Melinda Haji RN at 1/3/2018  1:39 PM     Author:  Melinda Haji RN Service:  (none) Author Type:  NURS- Registered Nurse     Filed:  1/3/2018  3:43 PM Date of Service:  1/3/2018  1:39 PM Status:  Signed     :  Melinda Haji RN (NURS- Registered Nurse)            Universal Protocol    A. Pre-procedure verification complete yes  1-relevant information / documentation available, reviewed and properly matched to the patient; 2-consent accurate and complete, 3-equipment and supplies available    B. Site marking complete N/A  Site marked if not in continuous attendance with patient    C. TIME OUT completed yes  Time Out was conducted just prior to starting procedure to verify the eight required elements: 1-patient identity, 2-consent accurate and complete, 3-position, 4-correct side/site marked (if applicable), 5-procedure, 6-relevant images / results properly labeled and displayed (if applicable), 7-antibiotics / irrigation fluids (if applicable), 8-safety precautions.

## 2018-02-12 NOTE — OR POSTOP
Patient Information     Patient Name MRN Sex Natalie Powell [id number] Female 1945      OR PostOp by Melinda Haji RN at 1/3/2018  9:53 AM     Author:  Melinda Haji RN Service:  (none) Author Type:  NURS- Registered Nurse     Filed:  1/3/2018  3:42 PM Date of Service:  1/3/2018  9:53 AM Status:  Signed     :  Melinda Haji RN (NURS- Registered Nurse)            Discharge Note    Data:  Natalie Saab has been discharged home at 1353 via ambulatory accompanied by Registered Nurse.      Action:  Written discharge/follow-up instructions were provided to patient. Prescriptions : None.  Belongings sent with patient. Medications from home sent with patient/family: Not Applicable  Equipment none .     Response:  Patient verbalized understanding of discharge instructions, reason for discharge, and necessary follow-up appointments.

## 2018-03-07 DIAGNOSIS — E11.39 TYPE II OR UNSPECIFIED TYPE DIABETES MELLITUS WITH OPHTHALMIC MANIFESTATIONS, NOT STATED AS UNCONTROLLED(250.50) (H): Primary | ICD-10-CM

## 2018-03-09 NOTE — TELEPHONE ENCOUNTER
Please note no Problem List, Medication, etc downloaded from [employer]  into Vocab system yet.   LOV and labs-09/28/2017      Prescribing Provider: Darrell Swenson DO      Order Date: 09/28/2017  Ordered by: DARRELL SWENSON  Medication:metFORMIN (GLUCOPHAGE) 1,000 mg tablet    Qty:90 tablet   Ref:4  Start:9/28/2017   End:              Route:Oral                  ALMA ROSA:No   Class:eRx    Sig:Take 1 tablet by mouth 2 times daily with meals.    Pharmacy:[facility] - [facility] [id number] [place]      Unable to complete prescription refill per RNMedication Refill Policy.................... Pilar Paniagua ....................  3/9/2018   11:32 AM

## 2018-03-23 ENCOUNTER — HOSPITAL ENCOUNTER (OUTPATIENT)
Dept: GENERAL RADIOLOGY | Facility: OTHER | Age: 73
Discharge: HOME | End: 2018-03-23
Attending: RADIOLOGY | Admitting: RADIOLOGY
Payer: MEDICARE

## 2018-03-23 VITALS
TEMPERATURE: 97.6 F | DIASTOLIC BLOOD PRESSURE: 54 MMHG | HEART RATE: 81 BPM | RESPIRATION RATE: 16 BRPM | SYSTOLIC BLOOD PRESSURE: 116 MMHG | OXYGEN SATURATION: 97 %

## 2018-03-23 DIAGNOSIS — G43.709 CHRONIC MIGRAINE WITHOUT AURA WITHOUT STATUS MIGRAINOSUS, NOT INTRACTABLE: ICD-10-CM

## 2018-03-23 PROCEDURE — 64615 CHEMODENERV MUSC MIGRAINE: CPT

## 2018-03-23 PROCEDURE — 25000128 H RX IP 250 OP 636: Performed by: RADIOLOGY

## 2018-03-23 RX ORDER — DIPHENOXYLATE HYDROCHLORIDE AND ATROPINE SULFATE 2.5; .025 MG/1; MG/1
1 TABLET ORAL
COMMUNITY

## 2018-03-23 RX ORDER — OMEPRAZOLE 40 MG/1
40 CAPSULE, DELAYED RELEASE ORAL
COMMUNITY
Start: 2017-09-28

## 2018-03-23 RX ORDER — ATORVASTATIN CALCIUM 40 MG/1
40 TABLET, FILM COATED ORAL
COMMUNITY
Start: 2017-09-28

## 2018-03-23 RX ORDER — CITALOPRAM HYDROBROMIDE 40 MG/1
40 TABLET ORAL
COMMUNITY
Start: 2017-09-28 | End: 2018-10-10

## 2018-03-23 RX ORDER — ASPIRIN 81 MG/1
TABLET ORAL
COMMUNITY
Start: 2007-05-18

## 2018-03-23 RX ORDER — GABAPENTIN 300 MG/1
CAPSULE ORAL
COMMUNITY
Start: 2017-09-28

## 2018-03-23 RX ORDER — FUROSEMIDE 20 MG
TABLET ORAL
COMMUNITY
Start: 2017-09-28 | End: 2018-10-10

## 2018-03-23 RX ADMIN — ONABOTULINUMTOXINA 175 UNITS: 200 INJECTION, POWDER, LYOPHILIZED, FOR SOLUTION INTRADERMAL; INTRAMUSCULAR at 14:46

## 2018-03-23 NOTE — DISCHARGE INSTRUCTIONS
Post-Injection Instructions:  -- Botulinum toxin may not produce noticeable effects for a week or more, and the effect may not peak until 1 month post injection.  Muscle soreness and slight redness at the injection sites are normal in the first few days after injection, and some people report low grade fevers or generalized malaise.  These should improve.  If they do not or worsen, contact your primary care provider.   -- If you are significantly weaker, are having shortness of breath or difficulty breathing or begin having increased salivation or difficulty swallowing, contact the clinic immediately.   -- Continue to perform gentle stretching and Range of motion exercises in the injected limb.  Avoid strenuous activities in the muscle, no more vigorous than your usual activities.    -- Continue taking your oral antispasticity medications if you are taking any.    -- Try to make note of the tightness/spasms in the areas that were injected, and see if botulinum toxin makes them looser, or makes it easier to perform mobility or self-care activities like dressing or toileting.

## 2018-07-23 NOTE — PROGRESS NOTES
Patient Information     Patient Name  Natalie Saab MRN  [id number] Sex  Female   1945      Letter by Stephanie Swenson DO at      Author:  Stephanie Swenson DO Service:  (none) Author Type:  (none)    Filed:   Encounter Date:  2017 Status:  (Other)           Natalie Saab  [address] [place] [address]          2017    Dear Ms. Saab:    This is to remind you that you are due for your follow up labs and an office visit with STEPHANIE SWENSON DO. Your last visit was on 2017.       Additional refills of your medication require you to complete this visit.    Please call [phone] to schedule your appointment.    Thank you for choosing [facility] for your health care needs.    Sincerely,      Refill RN  [facility]

## 2018-10-10 DIAGNOSIS — I10 ESSENTIAL HYPERTENSION: Primary | ICD-10-CM

## 2018-10-10 DIAGNOSIS — F43.21 ADJUSTMENT DISORDER WITH DEPRESSED MOOD: ICD-10-CM

## 2018-10-10 NOTE — LETTER
October 15, 2018      Natalie Saab  [address] [place] [address]        This is to remind you that you are due for your annual office visit and labs.  Your last visit with   Stephanie Martinez DO was on 09/07/2017.     Additional refills of your medication require you to complete this visit.    Please call [phone] to schedule your appointment.    Thank you for choosing [facility] for your health care needs.    Sincerely,      Refill RN  [facility]

## 2018-10-15 PROBLEM — K21.9 GASTROESOPHAGEAL REFLUX: Status: ACTIVE | Noted: 2018-10-15

## 2018-10-15 PROBLEM — E78.5 HYPERLIPIDEMIA: Status: ACTIVE | Noted: 2018-10-15

## 2018-10-15 PROBLEM — E11.9 DIABETES MELLITUS, TYPE II (H): Status: ACTIVE | Noted: 2018-10-15

## 2018-10-15 PROBLEM — M15.9 GENERALIZED OSTEOARTHRITIS: Status: ACTIVE | Noted: 2018-10-15

## 2018-10-15 PROBLEM — M54.2 NECK PAIN: Status: ACTIVE | Noted: 2018-10-15

## 2018-10-15 RX ORDER — LOSARTAN POTASSIUM 50 MG/1
TABLET ORAL
Qty: 90 TABLET | Refills: 0 | Status: SHIPPED | OUTPATIENT
Start: 2018-10-15 | End: 2018-10-25

## 2018-10-15 RX ORDER — FUROSEMIDE 20 MG
TABLET ORAL
Qty: 180 TABLET | Refills: 0 | Status: SHIPPED | OUTPATIENT
Start: 2018-10-15

## 2018-10-15 RX ORDER — CITALOPRAM HYDROBROMIDE 40 MG/1
TABLET ORAL
Qty: 90 TABLET | Refills: 0 | Status: SHIPPED | OUTPATIENT
Start: 2018-10-15

## 2018-10-15 NOTE — TELEPHONE ENCOUNTER
Celexa, Furosemide, Cozaar  LOV and labs-09/07/2017    Due for exam.  Limited refill per protocol and letter mailed.  Pilar Paniagua ........   10/15/2018    8:19 AM

## 2018-10-18 DIAGNOSIS — R07.9 CHEST PAIN, UNSPECIFIED TYPE: Primary | ICD-10-CM

## 2018-10-25 ENCOUNTER — HOSPITAL ENCOUNTER (OUTPATIENT)
Dept: NUCLEAR MEDICINE | Facility: OTHER | Age: 73
End: 2018-10-25
Payer: MEDICARE

## 2018-10-25 DIAGNOSIS — R07.9 CHEST PAIN, UNSPECIFIED TYPE: ICD-10-CM

## 2018-10-25 PROCEDURE — 93018 CV STRESS TEST I&R ONLY: CPT | Performed by: INTERNAL MEDICINE

## 2018-10-25 PROCEDURE — 34300033 ZZH RX 343

## 2018-10-25 PROCEDURE — 25000128 H RX IP 250 OP 636: Performed by: INTERNAL MEDICINE

## 2018-10-25 PROCEDURE — 93016 CV STRESS TEST SUPVJ ONLY: CPT | Performed by: INTERNAL MEDICINE

## 2018-10-25 PROCEDURE — A9500 TC99M SESTAMIBI: HCPCS

## 2018-10-25 PROCEDURE — 78452 HT MUSCLE IMAGE SPECT MULT: CPT

## 2018-10-25 RX ORDER — DOBUTAMINE HYDROCHLORIDE 200 MG/100ML
10-40 INJECTION INTRAVENOUS CONTINUOUS
Status: DISCONTINUED | OUTPATIENT
Start: 2018-10-25 | End: 2018-10-25

## 2018-10-25 RX ORDER — DOBUTAMINE HYDROCHLORIDE 200 MG/100ML
10-40 INJECTION INTRAVENOUS CONTINUOUS
Status: DISCONTINUED | OUTPATIENT
Start: 2018-10-25 | End: 2018-10-26 | Stop reason: HOSPADM

## 2018-10-25 RX ORDER — SODIUM CHLORIDE 9 MG/ML
INJECTION, SOLUTION INTRAVENOUS CONTINUOUS
Status: ACTIVE | OUTPATIENT
Start: 2018-10-25 | End: 2018-10-25

## 2018-10-25 RX ORDER — ATROPINE SULFATE 0.1 MG/ML
.2-2 INJECTION INTRAVENOUS
Status: DISCONTINUED | OUTPATIENT
Start: 2018-10-25 | End: 2018-10-26 | Stop reason: HOSPADM

## 2018-10-25 RX ADMIN — KIT FOR THE PREPARATION OF TECHNETIUM TC99M SESTAMIBI 32.7 MILLICURIE: 1 INJECTION, POWDER, LYOPHILIZED, FOR SOLUTION PARENTERAL at 10:10

## 2018-10-25 RX ADMIN — SODIUM CHLORIDE: 0.9 INJECTION, SOLUTION INTRAVENOUS at 10:12

## 2018-10-25 RX ADMIN — DOBUTAMINE IN DEXTROSE 10 MCG/KG/MIN: 200 INJECTION, SOLUTION INTRAVENOUS at 10:02

## 2018-10-25 RX ADMIN — KIT FOR THE PREPARATION OF TECHNETIUM TC99M SESTAMIBI 8.6 MILLICURIE: 1 INJECTION, POWDER, LYOPHILIZED, FOR SOLUTION PARENTERAL at 08:25

## 2018-10-25 NOTE — PROGRESS NOTES
0800 The patient arrived for a Dobutamine Cardiolite stress test. The procedure, risks, and benefits were discussed with the patient and her , and the consent was signed. A saline lock was started, and the Cardiolite was injected by x-ray. The patient was taken to the waiting area, to await resting images at  0915.  0940 The patient returned from x-ray, and was prepped for the stress test.  Dr. Olsen arrived, and the Dobutamine protocol was given up to 30 mcg/kg/hr. The patient felt her heart race, felt shaky and was flushed. These symptoms resolved after IV saline given. She  was given a snack and taken to x-ray in stable condition, for stress images. The saline lock will be removed by x-ray for proper disposal. Please see the chart for the complete test results.

## 2019-02-27 DIAGNOSIS — F43.21 ADJUSTMENT DISORDER WITH DEPRESSED MOOD: ICD-10-CM

## 2019-02-28 RX ORDER — CITALOPRAM HYDROBROMIDE 40 MG/1
TABLET ORAL
Qty: 90 TABLET | Refills: 0 | OUTPATIENT
Start: 2019-02-28

## 2019-02-28 NOTE — TELEPHONE ENCOUNTER
Routing refill request to provider for review/approval because:  Cyndy given x1 and patient did not follow up, please advise  Patient needs to be seen because it has been more than 1 year since last office visit.    Last PHQ 9: 5 on 10/27/16    Called and spoke with Natalie and she states she has new PCP in [place].    Patient will call pharmacy and let them know and Rx denied.    Autumn Sanchez RN on 2/28/2019 at 11:55 AM

## 2019-04-30 ENCOUNTER — TELEPHONE (OUTPATIENT)
Dept: GENERAL RADIOLOGY | Facility: OTHER | Age: 74
End: 2019-04-30

## 2019-04-30 NOTE — TELEPHONE ENCOUNTER
Patient would like to resume botox injections. Her last botox injections were in March of 2018 with Dr. Patrick. She now has a new PCP in [place] but is unable to recall the provider's name. This message relayed to Teofilo Elena for review.

## 2019-05-22 ENCOUNTER — HOSPITAL ENCOUNTER (OUTPATIENT)
Dept: GENERAL RADIOLOGY | Facility: OTHER | Age: 74
Discharge: HOME | End: 2019-05-22
Attending: RADIOLOGY | Admitting: RADIOLOGY
Payer: COMMERCIAL

## 2019-05-22 VITALS
TEMPERATURE: 96.8 F | DIASTOLIC BLOOD PRESSURE: 87 MMHG | RESPIRATION RATE: 16 BRPM | HEART RATE: 76 BPM | SYSTOLIC BLOOD PRESSURE: 140 MMHG | OXYGEN SATURATION: 93 %

## 2019-05-22 DIAGNOSIS — G43.711 CHRONIC MIGRAINE WITHOUT AURA, WITH INTRACTABLE MIGRAINE, SO STATED, WITH STATUS MIGRAINOSUS: ICD-10-CM

## 2019-05-22 PROCEDURE — 64615 CHEMODENERV MUSC MIGRAINE: CPT

## 2019-05-22 PROCEDURE — 25000128 H RX IP 250 OP 636: Performed by: RADIOLOGY

## 2019-05-22 RX ADMIN — ONABOTULINUMTOXINA 170 UNITS: 200 INJECTION, POWDER, LYOPHILIZED, FOR SOLUTION INTRADERMAL; INTRAMUSCULAR at 14:06

## 2019-05-22 NOTE — PROGRESS NOTES
Prior to the start of the procedure, time out conducted with Dr. Patrick and patient.  Patient tolerated injections well.Treatment is for migraines.  Minimal bleeding at injection sites.  170 total units of botox medication injected by Dr. Patrick.  Reviewed discharge instructions with patient. No questions at this time. Reminded patient that he/she will receive a call from the radiology department approximately 4 weeks from this injection.  It is important to answer this phone call as insurance requires it in order to schedule subsequent injections.  Patient verbalized understanding of instructions.  Discharged to home via private vehicle.

## 2019-06-19 ENCOUNTER — TELEPHONE (OUTPATIENT)
Dept: GENERAL RADIOLOGY | Facility: OTHER | Age: 74
End: 2019-06-19

## 2019-06-19 NOTE — TELEPHONE ENCOUNTER
Has the number of migraine days decreased since your Botox injection?  Yes    Has the number of hours per day decreased since your Botox injection?  Yes    Has the severity of the migraine decreased since your Botox injection?  Yes    Pain Scale:  10/10    Headache Location and Description:  throbbing pain, unilateral in the both occipital area    Headache Associated Symptoms:  light sensitivity- flashing into eyes.    Headache Pain Context:  Vague, shooting into eyes.     Do you want to schedule a 12 week Botox migraine treatment?  Yes- however patient was charged 4,000 for last botox treatment, so if insurance will not cover next appointment she will not pay out of pocket.

## 2019-08-13 ENCOUNTER — TELEPHONE (OUTPATIENT)
Dept: GENERAL RADIOLOGY | Facility: OTHER | Age: 74
End: 2019-08-13

## 2019-08-13 NOTE — TELEPHONE ENCOUNTER
Called to see if patient received the botox savings program information that Teofilo mailed out. She states she did not. The botox injections were very helpful for her so she is hopeful she can get the cost covered. I sent a new one out to her today.

## 2019-08-20 ENCOUNTER — TELEPHONE (OUTPATIENT)
Dept: GENERAL RADIOLOGY | Facility: OTHER | Age: 74
End: 2019-08-20

## 2019-08-20 NOTE — TELEPHONE ENCOUNTER
Patient states that she has received the Botox savings booklet, and will also talk with her insurance company for Botox coverage. Will let  know to put patient on the schedule to keep receiving injections.

## 2019-08-29 ENCOUNTER — TRANSFERRED RECORDS (OUTPATIENT)
Dept: HEALTH INFORMATION MANAGEMENT | Facility: CLINIC | Age: 74
End: 2019-08-29

## 2019-09-13 ENCOUNTER — HOSPITAL ENCOUNTER (OUTPATIENT)
Dept: GENERAL RADIOLOGY | Facility: OTHER | Age: 74
Discharge: HOME | End: 2019-09-13
Attending: RADIOLOGY | Admitting: FAMILY MEDICINE
Payer: COMMERCIAL

## 2019-09-13 DIAGNOSIS — G43.711 CHRONIC MIGRAINE WITHOUT AURA, WITH INTRACTABLE MIGRAINE, SO STATED, WITH STATUS MIGRAINOSUS: ICD-10-CM

## 2019-09-13 PROCEDURE — 64615 CHEMODENERV MUSC MIGRAINE: CPT

## 2019-09-13 PROCEDURE — 25000128 H RX IP 250 OP 636: Performed by: RADIOLOGY

## 2019-09-13 RX ORDER — ALPRAZOLAM 0.5 MG
TABLET ORAL
COMMUNITY
Start: 2019-06-11

## 2019-09-13 RX ORDER — INSULIN PUMP SYRINGE, 3 ML
EACH MISCELLANEOUS
COMMUNITY
Start: 2017-02-17

## 2019-09-13 RX ORDER — LOSARTAN POTASSIUM 50 MG/1
TABLET ORAL
COMMUNITY
Start: 2018-10-17

## 2019-09-13 RX ORDER — LANCETS
EACH MISCELLANEOUS
Refills: 0 | COMMUNITY
Start: 2019-08-31

## 2019-09-13 RX ORDER — BLOOD SUGAR DIAGNOSTIC
STRIP MISCELLANEOUS
Refills: 0 | COMMUNITY
Start: 2019-08-31

## 2019-09-13 RX ORDER — GLIPIZIDE 5 MG/1
TABLET ORAL
COMMUNITY
Start: 2019-09-08

## 2019-09-13 RX ORDER — MECLIZINE HCL 12.5 MG 12.5 MG/1
TABLET ORAL
COMMUNITY
Start: 2019-06-18

## 2019-09-13 RX ORDER — CETIRIZINE HYDROCHLORIDE 10 MG/1
TABLET ORAL
COMMUNITY
Start: 2019-04-11

## 2019-09-13 RX ORDER — BLOOD-GLUCOSE METER
EACH MISCELLANEOUS
Refills: 0 | COMMUNITY
Start: 2019-09-03

## 2019-09-13 RX ORDER — IBUPROFEN 800 MG/1
800 TABLET, FILM COATED ORAL
COMMUNITY
Start: 2016-06-09

## 2019-09-13 RX ADMIN — ONABOTULINUMTOXINA 170 UNITS: 200 INJECTION, POWDER, LYOPHILIZED, FOR SOLUTION INTRADERMAL; INTRAMUSCULAR at 11:11

## 2019-09-13 NOTE — PROGRESS NOTES
Prior to the start of the procedure, time out conducted with Dr. Patrick and patient.  Patient tolerated injections well.  Treatment is for migraine.  Minimal bleeding at injection sites.  170 total units of botox medication injected by Dr. Patrick.  Reviewed discharge instructions with patient. No questions at this time. Reminded patient that he/she will receive a call from the radiology department approximately 4 weeks from this injection.  It is important to answer this phone call as insurance requires it in order to schedule subsequent injections.  Patient verbalized understanding of instructions.  Discharged to home via private vehicle.

## 2019-10-23 ENCOUNTER — TELEPHONE (OUTPATIENT)
Dept: GENERAL RADIOLOGY | Facility: OTHER | Age: 74
End: 2019-10-23

## 2019-10-24 ENCOUNTER — TELEPHONE (OUTPATIENT)
Dept: GENERAL RADIOLOGY | Facility: OTHER | Age: 74
End: 2019-10-24

## 2019-10-25 ENCOUNTER — TELEPHONE (OUTPATIENT)
Dept: GENERAL RADIOLOGY | Facility: OTHER | Age: 74
End: 2019-10-25

## 2019-11-04 NOTE — TELEPHONE ENCOUNTER
Has the number of migraine days decreased since your Botox injection?  Yes    Has the number of hours per day decreased since your Botox injection?  Yes    Has the severity of the migraine decreased since your Botox injection?  Some yes, some no    Pain Scale:  8/10    Headache Location and Description:  throbbing pain, dull pain, sharp pain, squeezing pain, bilateral in the occipital area, unilateral in the right frontal area    Headache Associated Symptoms:  nausea, light sensitivity, sound sensitivity and vision changes (blurred and eyes hurt)    Headache Pain Context:  achey, dull and vague    Do you want to schedule a 12 week Botox migraine treatment?  Yes

## 2019-12-06 ENCOUNTER — HOSPITAL ENCOUNTER (OUTPATIENT)
Dept: GENERAL RADIOLOGY | Facility: OTHER | Age: 74
Discharge: HOME | End: 2019-12-06
Attending: RADIOLOGY | Admitting: FAMILY MEDICINE
Payer: COMMERCIAL

## 2019-12-06 DIAGNOSIS — G43.711 CHRONIC MIGRAINE WITHOUT AURA, WITH INTRACTABLE MIGRAINE, SO STATED, WITH STATUS MIGRAINOSUS: ICD-10-CM

## 2019-12-06 PROCEDURE — 25000128 H RX IP 250 OP 636: Performed by: RADIOLOGY

## 2019-12-06 PROCEDURE — 64615 CHEMODENERV MUSC MIGRAINE: CPT

## 2019-12-06 RX ORDER — BLOOD-GLUCOSE CONTROL, LOW
EACH MISCELLANEOUS
COMMUNITY
Start: 2019-10-29

## 2019-12-06 RX ORDER — CIPROFLOXACIN HYDROCHLORIDE 3.5 MG/ML
SOLUTION/ DROPS TOPICAL
COMMUNITY
Start: 2019-05-16

## 2019-12-06 RX ORDER — BLOOD PRESSURE TEST KIT
KIT MISCELLANEOUS
COMMUNITY
Start: 2019-10-29

## 2019-12-06 RX ORDER — FLUTICASONE PROPIONATE 50 MCG
SPRAY, SUSPENSION (ML) NASAL
COMMUNITY
Start: 2019-12-05

## 2019-12-06 RX ADMIN — ONABOTULINUMTOXINA 170 UNITS: 200 INJECTION, POWDER, LYOPHILIZED, FOR SOLUTION INTRADERMAL; INTRAMUSCULAR at 12:08

## 2019-12-31 ENCOUNTER — TELEPHONE (OUTPATIENT)
Dept: GENERAL RADIOLOGY | Facility: OTHER | Age: 74
End: 2019-12-31

## 2020-01-03 ENCOUNTER — TELEPHONE (OUTPATIENT)
Dept: GENERAL RADIOLOGY | Facility: OTHER | Age: 75
End: 2020-01-03

## 2020-01-03 NOTE — TELEPHONE ENCOUNTER
Has the number of migraine days decreased since your Botox injection?  Yes    Has the number of hours per day decreased since your Botox injection?  Yes    Has the severity of the migraine decreased since your Botox injection?  Yes    Pain Scale:  6/10    Headache Location and Description:  throbbing pain, dull pain    Headache Associated Symptoms:  Back of head     Headache Pain Context:  achey    Do you want to schedule a 12 week Botox migraine treatment?  Yes        Have you been able to move your head/neck easier since your Botox injection?  Yes- only relief on left side- patient would like Botox on right side of neck next time    Do your muscles feel better/less tight?  Yes    Have you had any relief at all?  Yes    Pain Scale:  0/10-on left side of neck    Do you want to schedule a 12 week Botox migraine treatment? Yes

## 2020-03-02 ENCOUNTER — HOSPITAL ENCOUNTER (OUTPATIENT)
Dept: GENERAL RADIOLOGY | Facility: OTHER | Age: 75
Discharge: HOME | End: 2020-03-02
Attending: RADIOLOGY | Admitting: RADIOLOGY
Payer: COMMERCIAL

## 2020-03-02 VITALS
DIASTOLIC BLOOD PRESSURE: 91 MMHG | HEART RATE: 74 BPM | RESPIRATION RATE: 16 BRPM | TEMPERATURE: 96.9 F | OXYGEN SATURATION: 99 % | SYSTOLIC BLOOD PRESSURE: 159 MMHG

## 2020-03-02 DIAGNOSIS — G43.711 CHRONIC MIGRAINE WITHOUT AURA, WITH INTRACTABLE MIGRAINE, SO STATED, WITH STATUS MIGRAINOSUS: ICD-10-CM

## 2020-03-02 PROCEDURE — 64615 CHEMODENERV MUSC MIGRAINE: CPT

## 2020-03-02 PROCEDURE — 25000128 H RX IP 250 OP 636: Performed by: RADIOLOGY

## 2020-03-02 RX ORDER — ONDANSETRON 4 MG/1
TABLET, ORALLY DISINTEGRATING ORAL
COMMUNITY
Start: 2020-01-21

## 2020-03-02 RX ORDER — NITROFURANTOIN 25; 75 MG/1; MG/1
CAPSULE ORAL
COMMUNITY
Start: 2020-01-24

## 2020-03-02 RX ADMIN — ONABOTULINUMTOXINA 185 UNITS: 200 INJECTION, POWDER, LYOPHILIZED, FOR SOLUTION INTRADERMAL; INTRAMUSCULAR at 14:10

## 2020-03-31 ENCOUNTER — TELEPHONE (OUTPATIENT)
Dept: GENERAL RADIOLOGY | Facility: OTHER | Age: 75
End: 2020-03-31

## 2020-03-31 NOTE — TELEPHONE ENCOUNTER
Has the number of migraine days decreased since your Botox injection? No, see below.    Has the number of hours per day decreased since your Botox injection?  No, they have been constant since the injection, even in the middle of the night.    Has the severity of the migraine decreased since your Botox injection?  No    For the first 4 weeks, patient continued to have migraine headaches, originating in neck and back of head, and radiating to eyes. The right side above ear is the most painful area. On the worst days, the pain has been at 8-9/10 on pain scale. Occasionally accompanied by nausea, light and sound sensitivity, vision changes including blurred vision (also thinks she is due for a vision check).     I have been taking excedrin for these with minimal relief from the excedrin.     Pain Scale:  2-3/10 today    Headache Location and Description:  Originates in neck and back of head, radiating to behind and around eye sockets. The right side above her ear is the most painful. These headaches are very severe, she states.     Headache Associated Symptoms:  nausea, light sensitivity, sound sensitivity and vision changes: blurred vision    Headache Pain Context:  stabbing and throbbing    Do you want to schedule a 10 week Botox migraine treatment?  Yes, I would like to have injections at 10 weeks if possible.

## 2020-05-15 ENCOUNTER — TELEPHONE (OUTPATIENT)
Dept: FAMILY MEDICINE | Facility: OTHER | Age: 75
End: 2020-05-15

## 2020-05-18 ENCOUNTER — HOSPITAL ENCOUNTER (OUTPATIENT)
Dept: GENERAL RADIOLOGY | Facility: OTHER | Age: 75
Discharge: HOME | End: 2020-05-18
Attending: RADIOLOGY | Admitting: RADIOLOGY
Payer: COMMERCIAL

## 2020-05-18 VITALS
TEMPERATURE: 97.9 F | SYSTOLIC BLOOD PRESSURE: 146 MMHG | OXYGEN SATURATION: 96 % | RESPIRATION RATE: 16 BRPM | DIASTOLIC BLOOD PRESSURE: 96 MMHG | HEART RATE: 89 BPM

## 2020-05-18 DIAGNOSIS — G43.711 CHRONIC MIGRAINE WITHOUT AURA, WITH INTRACTABLE MIGRAINE, SO STATED, WITH STATUS MIGRAINOSUS: ICD-10-CM

## 2020-05-18 PROCEDURE — 64615 CHEMODENERV MUSC MIGRAINE: CPT

## 2020-05-18 PROCEDURE — 25000128 H RX IP 250 OP 636: Performed by: RADIOLOGY

## 2020-05-18 RX ADMIN — ONABOTULINUMTOXINA 185 UNITS: 200 INJECTION, POWDER, LYOPHILIZED, FOR SOLUTION INTRADERMAL; INTRAMUSCULAR at 12:38

## 2020-06-16 ENCOUNTER — TELEPHONE (OUTPATIENT)
Dept: GENERAL RADIOLOGY | Facility: OTHER | Age: 75
End: 2020-06-16

## 2020-06-16 NOTE — TELEPHONE ENCOUNTER
Has the number of migraine days decreased since your Botox injection?  No    Has the number of hours per day decreased since your Botox injection?  No, they have been ongoing since the last injection, waking me in the night. I'm not really getting any relief.     Has the severity of the migraine decreased since your Botox injection?  No    Pain Scale:  6/10 today    Headache Location and Description:  Originates in back of neck, up the back of head, radiating still to behind the eyes, and around the eye sockets. It is still continuing to be very painful in the right temple. She states the headaches are very painful and pretty debilitating.     Headache Associated Symptoms:  nausea, light sensitivity, sound sensitivity and vision changes: blurry vision.    Headache Pain Context:  stabbing and throbbing    Do you want to schedule a 10-11 week Botox migraine treatment?  Not right now. She has a new PCP in [place] and is going to get in to see him to see if there are other options to treat her migraines. Plan: this writer will call her in 4 weeks to see how she is feeling and explore whether or not she has found an alternative treatment that works better. Patient is agreeable to this, and will anticipate my call in 4 weeks for follow-up.

## 2020-07-13 ENCOUNTER — TELEPHONE (OUTPATIENT)
Dept: GENERAL RADIOLOGY | Facility: OTHER | Age: 75
End: 2020-07-13

## 2020-07-13 NOTE — TELEPHONE ENCOUNTER
Patient states that she would like to try medications to treat her migraines- she will contact  radiology if she would like to retry botox treatment.     Nanette Uribe RN on 7/13/2020 at 10:58 AM

## 2022-08-11 ENCOUNTER — MEDICAL CORRESPONDENCE (OUTPATIENT)
Dept: HEALTH INFORMATION MANAGEMENT | Facility: OTHER | Age: 77
End: 2022-08-11

## 2022-08-12 ENCOUNTER — TELEPHONE (OUTPATIENT)
Dept: FAMILY MEDICINE | Facility: OTHER | Age: 77
End: 2022-08-12

## 2022-08-12 NOTE — TELEPHONE ENCOUNTER
[facility] is looking for verbal orders for PT 2X a week for 3 weeks then 1x a week for 2 weeks also looking for Skilled nursing evaluation and treat and medical social work for evaluation and treatment facility was told [facility] is not in clinic until 08.25.22  Please call and advise    Thank You    Sheila Acevedo on 8/12/2022 at 10:10 AM

## 2022-08-12 NOTE — TELEPHONE ENCOUNTER
Monica with [facility] care is hoping to get verbal orders today, to get a nurse out to see patient today still, patient was in hospital for accidental overdose on pain meds, she is having dizzniess and pain in cervical and legs. They are wanting to go over proper medication dosages, she was in hospital 8/2/2022 8/8/2022    Brooke Chaidez on 8/12/2022 at 10:56 AM

## 2023-04-01 LAB — RETINOPATHY: NORMAL

## 2023-10-03 NOTE — PROGRESS NOTES
Patient Information     Patient Name MRN Sex Natalie Powell [id number] Female 1945      Progress Notes by Sophie Pryor at 2017  1:45 PM     Author:  Sophie Pryor  Service:  (none) Author Type:  Other Clinical Staff     Filed:  2017  2:02 PM  Date of Service:  2017  1:45 PM Status:  Addendum     :  Sophie Pryor (Other Clinical Staff)        Related Notes: Original Note by Valentine Heredia (Other Clinical Staff) filed at 2017  1:45 PM            1.  Has the patient had a previous reaction to IV contrast? No    2.  Does the patient have kidney disease? Metformin    3.  Is the patient on dialysis? No    If YES to any of these questions, exam will be reviewed with a Radiologist before administering contrast.          The cardioversion/defibrillation pads were placed in the anterior/posterior position.  The presenting arrhythmia was atrial flutter. A direct-current 300 joule discharge was delivered synchronized to the ECG R-wave. The post cardioversion rhythm was sinus.

## 2024-04-16 ENCOUNTER — MEDICAL CORRESPONDENCE (OUTPATIENT)
Dept: HEALTH INFORMATION MANAGEMENT | Facility: OTHER | Age: 79
End: 2024-04-16
Payer: COMMERCIAL

## 2024-04-16 ENCOUNTER — TRANSFERRED RECORDS (OUTPATIENT)
Dept: HEALTH INFORMATION MANAGEMENT | Facility: OTHER | Age: 79
End: 2024-04-16
Payer: COMMERCIAL

## 2024-04-16 LAB
ALBUMIN (URINE) MG/SPEC: 30 MG/L
ALBUMIN/CREATININE RATIO: >300 MG/G
ALT SERPL-CCNC: 31 U/L (ref 0–34)
AST SERPL-CCNC: 33 U/L (ref 14–36)
CREATININE (EXTERNAL): 0.9 MG/DL (ref 0.52–1.04)
CREATININE (URINE): 10 MG/DL
GFR ESTIMATED (EXTERNAL): 65 ML/MIN/1.73M2
GLUCOSE (EXTERNAL): 143 MG/DL (ref 74–100)
HBA1C MFR BLD: 7.4 % (ref 4–6)
POTASSIUM (EXTERNAL): 3.7 MMOL/L (ref 3.4–5)